# Patient Record
Sex: FEMALE | Race: WHITE | URBAN - NONMETROPOLITAN AREA
[De-identification: names, ages, dates, MRNs, and addresses within clinical notes are randomized per-mention and may not be internally consistent; named-entity substitution may affect disease eponyms.]

---

## 2018-03-28 ENCOUNTER — OFFICE VISIT (OUTPATIENT)
Dept: NEUROLOGY | Age: 68
End: 2018-03-28
Payer: MEDICARE

## 2018-03-28 VITALS
BODY MASS INDEX: 30.53 KG/M2 | SYSTOLIC BLOOD PRESSURE: 138 MMHG | WEIGHT: 190 LBS | DIASTOLIC BLOOD PRESSURE: 74 MMHG | HEIGHT: 66 IN

## 2018-03-28 DIAGNOSIS — G25.0 ESSENTIAL TREMOR: Primary | ICD-10-CM

## 2018-03-28 DIAGNOSIS — Z87.39 HISTORY OF OSTEOARTHRITIS: ICD-10-CM

## 2018-03-28 DIAGNOSIS — Z86.59 HISTORY OF ANXIETY: ICD-10-CM

## 2018-03-28 PROCEDURE — 3014F SCREEN MAMMO DOC REV: CPT | Performed by: PSYCHIATRY & NEUROLOGY

## 2018-03-28 PROCEDURE — G8427 DOCREV CUR MEDS BY ELIG CLIN: HCPCS | Performed by: PSYCHIATRY & NEUROLOGY

## 2018-03-28 PROCEDURE — 1036F TOBACCO NON-USER: CPT | Performed by: PSYCHIATRY & NEUROLOGY

## 2018-03-28 PROCEDURE — G8417 CALC BMI ABV UP PARAM F/U: HCPCS | Performed by: PSYCHIATRY & NEUROLOGY

## 2018-03-28 PROCEDURE — 1090F PRES/ABSN URINE INCON ASSESS: CPT | Performed by: PSYCHIATRY & NEUROLOGY

## 2018-03-28 PROCEDURE — G8400 PT W/DXA NO RESULTS DOC: HCPCS | Performed by: PSYCHIATRY & NEUROLOGY

## 2018-03-28 PROCEDURE — 4040F PNEUMOC VAC/ADMIN/RCVD: CPT | Performed by: PSYCHIATRY & NEUROLOGY

## 2018-03-28 PROCEDURE — 1123F ACP DISCUSS/DSCN MKR DOCD: CPT | Performed by: PSYCHIATRY & NEUROLOGY

## 2018-03-28 PROCEDURE — 99204 OFFICE O/P NEW MOD 45 MIN: CPT | Performed by: PSYCHIATRY & NEUROLOGY

## 2018-03-28 PROCEDURE — 3017F COLORECTAL CA SCREEN DOC REV: CPT | Performed by: PSYCHIATRY & NEUROLOGY

## 2018-03-28 PROCEDURE — G8484 FLU IMMUNIZE NO ADMIN: HCPCS | Performed by: PSYCHIATRY & NEUROLOGY

## 2018-03-28 RX ORDER — LANOLIN ALCOHOL/MO/W.PET/CERES
3 CREAM (GRAM) TOPICAL DAILY
COMMUNITY

## 2018-03-28 RX ORDER — BUPROPION HYDROCHLORIDE 150 MG/1
150 TABLET ORAL EVERY MORNING
COMMUNITY

## 2018-03-28 RX ORDER — LORAZEPAM 0.5 MG/1
0.5 TABLET ORAL NIGHTLY PRN
COMMUNITY

## 2018-03-28 RX ORDER — HYDROCODONE BITARTRATE AND ACETAMINOPHEN 10; 325 MG/1; MG/1
1 TABLET ORAL 2 TIMES DAILY
COMMUNITY

## 2018-03-28 NOTE — PROGRESS NOTES
concentration appear appropriate  Recent and remote memory appears unremarkable  Speech normal without dysarthria  No clear issues with language of fund of knowledge    Cranial Nerve Exam   CN II- Visual fields grossly unremarkable. VA adequate. Discs sharp  CN III, IV,VI- PERRLA, EOMI, No nystagmus, conjugate eye movements, no ptosis  CN V-sensation intact to LT over face  CN VII-no facial asymmetry  CN VIII-Hearing intact   CN IX and X- Palate elevates in midline  CN XI-good shoulder shrug  CN XII-Tongue midline with no fasciculations or fibrillations    Motor Exam  V/V throughout upper and lower extremities bilaterally, no cogwheeling, normal tone    Sensory Exam  Sensation intact to light touch , PP  upper and lower extremities bilaterally; normal vibration sense in LE's    Reflexes   2+ biceps bilaterally  2+ brachioradialis  2+ triceps  2+patella  2+ ankle jerks  No clonus ankles  No Maria's sign bilateral hands. No Babinski sign. Tremors- mild to moderate postural tremor in the upper extremities. There is also a tremor in the legs when she is sitting on the table with her legs elevated. Gait  Normal base and speed  No ataxia. No Romberg sign    Coordination  Finger to nose and LUCIE-unremarkable    No results found for: [id number]  Lab Results   Component Value Date    WBC 6.96 12/11/2012    HGB 14.4 12/11/2012    HCT 43.2 12/11/2012    MCV 97.0 12/11/2012     12/11/2012     Lab Results   Component Value Date     12/11/2012     12/11/2012    CO2 32 (H) 12/11/2012    BUN 11 12/11/2012    CREATININE 0.6 12/11/2012    GLUCOSE 101 12/11/2012    CALCIUM 9.3 12/11/2012    LABGLOM 108 12/11/2012           Assessment    ICD-10-CM ICD-9-CM    1. Essential tremor G25.0 333.1    2. History of anxiety Z86.59 V11.8    3. History of osteoarthritis Z87.39 V13.4      We had an extended discussion regarding essential tremor and what can make it worse as well as all treatment options.  She opts for nonpharmacological treatment at this time. I would be more than happy to see her back again at any time. Plan    Return if symptoms worsen or fail to improve.     (Please note that portions of this note were completed with a voice recognition program. Efforts were made to edit the dictations but occasionally words are mis-transcribed.)